# Patient Record
Sex: FEMALE | Race: WHITE | NOT HISPANIC OR LATINO | Employment: FULL TIME | URBAN - NONMETROPOLITAN AREA
[De-identification: names, ages, dates, MRNs, and addresses within clinical notes are randomized per-mention and may not be internally consistent; named-entity substitution may affect disease eponyms.]

---

## 2023-11-09 ENCOUNTER — OFFICE VISIT (OUTPATIENT)
Dept: FAMILY MEDICINE | Facility: CLINIC | Age: 33
End: 2023-11-09
Payer: MEDICAID

## 2023-11-09 VITALS
RESPIRATION RATE: 18 BRPM | HEIGHT: 62 IN | BODY MASS INDEX: 28.89 KG/M2 | WEIGHT: 157 LBS | OXYGEN SATURATION: 96 % | HEART RATE: 88 BPM | SYSTOLIC BLOOD PRESSURE: 122 MMHG | DIASTOLIC BLOOD PRESSURE: 84 MMHG | TEMPERATURE: 99 F

## 2023-11-09 DIAGNOSIS — J30.9 ALLERGIC RHINITIS, UNSPECIFIED SEASONALITY, UNSPECIFIED TRIGGER: Primary | ICD-10-CM

## 2023-11-09 DIAGNOSIS — R09.81 NASAL CONGESTION: ICD-10-CM

## 2023-11-09 PROCEDURE — 3074F SYST BP LT 130 MM HG: CPT | Mod: CPTII,,, | Performed by: NURSE PRACTITIONER

## 2023-11-09 PROCEDURE — 1160F PR REVIEW ALL MEDS BY PRESCRIBER/CLIN PHARMACIST DOCUMENTED: ICD-10-PCS | Mod: CPTII,,, | Performed by: NURSE PRACTITIONER

## 2023-11-09 PROCEDURE — 3008F BODY MASS INDEX DOCD: CPT | Mod: CPTII,,, | Performed by: NURSE PRACTITIONER

## 2023-11-09 PROCEDURE — 3079F PR MOST RECENT DIASTOLIC BLOOD PRESSURE 80-89 MM HG: ICD-10-PCS | Mod: CPTII,,, | Performed by: NURSE PRACTITIONER

## 2023-11-09 PROCEDURE — 3008F PR BODY MASS INDEX (BMI) DOCUMENTED: ICD-10-PCS | Mod: CPTII,,, | Performed by: NURSE PRACTITIONER

## 2023-11-09 PROCEDURE — 1159F MED LIST DOCD IN RCRD: CPT | Mod: CPTII,,, | Performed by: NURSE PRACTITIONER

## 2023-11-09 PROCEDURE — 99204 OFFICE O/P NEW MOD 45 MIN: CPT | Mod: ,,, | Performed by: NURSE PRACTITIONER

## 2023-11-09 PROCEDURE — 99204 PR OFFICE/OUTPT VISIT, NEW, LEVL IV, 45-59 MIN: ICD-10-PCS | Mod: ,,, | Performed by: NURSE PRACTITIONER

## 2023-11-09 PROCEDURE — 1159F PR MEDICATION LIST DOCUMENTED IN MEDICAL RECORD: ICD-10-PCS | Mod: CPTII,,, | Performed by: NURSE PRACTITIONER

## 2023-11-09 PROCEDURE — 3079F DIAST BP 80-89 MM HG: CPT | Mod: CPTII,,, | Performed by: NURSE PRACTITIONER

## 2023-11-09 PROCEDURE — 3074F PR MOST RECENT SYSTOLIC BLOOD PRESSURE < 130 MM HG: ICD-10-PCS | Mod: CPTII,,, | Performed by: NURSE PRACTITIONER

## 2023-11-09 PROCEDURE — 1160F RVW MEDS BY RX/DR IN RCRD: CPT | Mod: CPTII,,, | Performed by: NURSE PRACTITIONER

## 2023-11-09 RX ORDER — FLUTICASONE FUROATE 27.5 UG/1
2 SPRAY, METERED NASAL DAILY
Qty: 9.1 ML | Refills: 0 | Status: SHIPPED | OUTPATIENT
Start: 2023-11-09 | End: 2024-01-18 | Stop reason: SDUPTHER

## 2023-11-09 NOTE — PROGRESS NOTES
Marline Thakur DNP, MIRANDA    [address] [place] [address]     PATIENT NAME: Denise Fuller  : 1990  DATE: 23  MRN: [id number]      Billing Provider: Marline Thakur DNP, FNP  Level of Service:   Patient PCP Information       Provider PCP Type    Primary Doctor No General            Reason for Visit / Chief Complaint: Sinus Problem (Complains of sinus headache, otalgia, and scratchy throat.)       Update PCP  Update Chief Complaint         History of Present Illness / Problem Focused Workflow     Denise Fuller presents to the clinic with Sinus Problem (Complains of sinus headache, otalgia, and scratchy throat.)     Sinus Problem  Associated symptoms include congestion, sinus pressure and a sore throat. Pertinent negatives include no chills, coughing, ear pain, headaches or shortness of breath.       Review of Systems     Review of Systems   Constitutional:  Negative for activity change, appetite change, chills, fatigue and fever.   HENT:  Positive for nasal congestion, sinus pressure/congestion and sore throat. Negative for ear pain, hearing loss and postnasal drip.    Respiratory:  Negative for cough, chest tightness, shortness of breath and wheezing.    Cardiovascular:  Negative for chest pain, palpitations, leg swelling and claudication.   Gastrointestinal:  Negative for abdominal pain, change in bowel habit, constipation, diarrhea, nausea and vomiting.   Genitourinary:  Negative for dysuria.   Musculoskeletal:  Negative for arthralgias, back pain, gait problem and myalgias.   Integumentary:  Negative for rash.   Neurological:  Negative for weakness and headaches.   Psychiatric/Behavioral:  Negative for suicidal ideas. The patient is not nervous/anxious.         Medical / Social / Family History   History reviewed. No pertinent past medical history.    History reviewed. No pertinent surgical history.    Social History  Ms. Denise Fuller  reports that she has  "never smoked. She has never been exposed to tobacco smoke. She has never used smokeless tobacco. She reports that she does not drink alcohol and does not use drugs.    Family History  Ms. Denise Fuller's family history is not on file.    Medications and Allergies     Medications  No outpatient medications have been marked as taking for the 11/9/23 encounter (Office Visit) with Marlnie Thakur, LETITIA, MIRANDA.       Allergies  Review of patient's allergies indicates:  No Known Allergies    Physical Examination     Vitals:    11/09/23 1647   BP: 122/84   BP Location: Left arm   Patient Position: Sitting   BP Method: Large (Automatic)   Pulse: 88   Resp: 18   Temp: 98.7 °F (37.1 °C)   TempSrc: Oral   SpO2: 96%   Weight: 71.2 kg (157 lb)   Height: 5' 2" (1.575 m)     Physical Exam  Vitals and nursing note reviewed.   Constitutional:       General: She is not in acute distress.     Appearance: Normal appearance. She is not ill-appearing.   HENT:      Nose: Congestion and rhinorrhea present.      Mouth/Throat:      Pharynx: No posterior oropharyngeal erythema.   Eyes:      Extraocular Movements: Extraocular movements intact.      Pupils: Pupils are equal, round, and reactive to light.   Cardiovascular:      Rate and Rhythm: Normal rate and regular rhythm.      Heart sounds: Normal heart sounds.   Pulmonary:      Effort: Pulmonary effort is normal.      Breath sounds: Normal breath sounds. No wheezing, rhonchi or rales.   Abdominal:      General: Bowel sounds are normal.      Palpations: Abdomen is soft.   Musculoskeletal:         General: Normal range of motion.   Skin:     Findings: No rash.   Neurological:      General: No focal deficit present.      Mental Status: She is alert and oriented to person, place, and time. Mental status is at baseline.   Psychiatric:         Mood and Affect: Mood normal.         Behavior: Behavior normal.          Assessment and Plan (including Health Maintenance)      Problem List  Smart Sets  " Document Outside HM   :    Plan:         Health Maintenance Due   Topic Date Due    Hepatitis C Screening  Never done    Cervical Cancer Screening  Never done    Lipid Panel  Never done    COVID-19 Vaccine (1) Never done    HIV Screening  Never done    TETANUS VACCINE  Never done    Influenza Vaccine (1) Never done    Hemoglobin A1c (Diabetic Prevention Screening)  Never done       Problem List Items Addressed This Visit    None  Visit Diagnoses       Allergic rhinitis, unspecified seasonality, unspecified trigger    -  Primary    Nasal congestion              Allergic rhinitis, unspecified seasonality, unspecified trigger    Nasal congestion       The patient has no Health Maintenance topics of status Not Due        No future appointments.     Follow up if symptoms worsen or fail to improve.     Signature:  Marline Thakur DNP, FNP  [address] [place] [address]  Phone #: [phone]  Fax #: [phone]    Date of encounter: 11/9/23    Patient Instructions   Increase fluid intake. Take meds as prescribed. Follow up if no improvement in 5-7 days.

## 2024-01-17 ENCOUNTER — OFFICE VISIT (OUTPATIENT)
Dept: FAMILY MEDICINE | Facility: CLINIC | Age: 34
End: 2024-01-17
Payer: MEDICAID

## 2024-01-17 VITALS
HEART RATE: 82 BPM | DIASTOLIC BLOOD PRESSURE: 86 MMHG | RESPIRATION RATE: 18 BRPM | BODY MASS INDEX: 31.08 KG/M2 | SYSTOLIC BLOOD PRESSURE: 123 MMHG | WEIGHT: 164.63 LBS | OXYGEN SATURATION: 96 % | TEMPERATURE: 99 F | HEIGHT: 61 IN

## 2024-01-17 DIAGNOSIS — R68.83 CHILLS: ICD-10-CM

## 2024-01-17 DIAGNOSIS — R05.1 ACUTE COUGH: ICD-10-CM

## 2024-01-17 DIAGNOSIS — J02.9 SORE THROAT: ICD-10-CM

## 2024-01-17 DIAGNOSIS — H92.09 OTALGIA, UNSPECIFIED LATERALITY: ICD-10-CM

## 2024-01-17 DIAGNOSIS — J10.1 INFLUENZA B: Primary | ICD-10-CM

## 2024-01-17 LAB
CTP QC/QA: YES
FLUAV AG NPH QL: NEGATIVE
FLUBV AG NPH QL: POSITIVE
S PYO RRNA THROAT QL PROBE: NEGATIVE
SARS-COV-2 AG RESP QL IA.RAPID: NEGATIVE

## 2024-01-17 PROCEDURE — 1160F RVW MEDS BY RX/DR IN RCRD: CPT | Mod: CPTII,,, | Performed by: NURSE PRACTITIONER

## 2024-01-17 PROCEDURE — 87880 STREP A ASSAY W/OPTIC: CPT | Mod: RHCUB | Performed by: NURSE PRACTITIONER

## 2024-01-17 PROCEDURE — 3008F BODY MASS INDEX DOCD: CPT | Mod: CPTII,,, | Performed by: NURSE PRACTITIONER

## 2024-01-17 PROCEDURE — 3079F DIAST BP 80-89 MM HG: CPT | Mod: CPTII,,, | Performed by: NURSE PRACTITIONER

## 2024-01-17 PROCEDURE — 1159F MED LIST DOCD IN RCRD: CPT | Mod: CPTII,,, | Performed by: NURSE PRACTITIONER

## 2024-01-17 PROCEDURE — 87426 SARSCOV CORONAVIRUS AG IA: CPT | Mod: RHCUB | Performed by: NURSE PRACTITIONER

## 2024-01-17 PROCEDURE — 87804 INFLUENZA ASSAY W/OPTIC: CPT | Mod: RHCUB | Performed by: NURSE PRACTITIONER

## 2024-01-17 PROCEDURE — 3074F SYST BP LT 130 MM HG: CPT | Mod: CPTII,,, | Performed by: NURSE PRACTITIONER

## 2024-01-17 PROCEDURE — 99213 OFFICE O/P EST LOW 20 MIN: CPT | Mod: ,,, | Performed by: NURSE PRACTITIONER

## 2024-01-17 RX ORDER — NORGESTIMATE AND ETHINYL ESTRADIOL 0.25-0.035
1 KIT ORAL DAILY
COMMUNITY
Start: 2024-01-08

## 2024-01-17 NOTE — PATIENT INSTRUCTIONS
Rest. Increase fluid intake. Symptomatic treatment.  Treat fever with ibuprofen or acetaminophen. Follow up if no improvement in 5-7 days.

## 2024-01-17 NOTE — PROGRESS NOTES
Marline Thakur DNP, FNP    [facility]  [address] [place] [address] [place] [address]     PATIENT NAME: Denise Fuller  : 1990  DATE: 24  MRN: [id number]      Billing Provider: Marline Thakur DNP, FNP  Level of Service: CT OFFICE/OUTPT VISIT, EST, LEVL III, 20-29 MIN  Patient PCP Information       Provider PCP Type    Primary Doctor No General            Reason for Visit / Chief Complaint: Fever (Fever and chills), Headache, Sore Throat, and Cough (Symptoms started yesterday.)       Update PCP  Update Chief Complaint         History of Present Illness / Problem Focused Workflow     Denise Fuller presents to the clinic with Fever (Fever and chills), Headache, Sore Throat, and Cough (Symptoms started yesterday.)     Fever   Associated symptoms include coughing, headaches and a sore throat. Pertinent negatives include no abdominal pain, chest pain, congestion, diarrhea, ear pain, nausea, rash, urinary pain, vomiting or wheezing.   Headache   Associated symptoms include coughing, a fever and a sore throat. Pertinent negatives include no abdominal pain, back pain, ear pain, hearing loss, nausea, vomiting or weakness.   Sore Throat   Associated symptoms include coughing and headaches. Pertinent negatives include no abdominal pain, congestion, diarrhea, ear pain, shortness of breath or vomiting.   Cough  Associated symptoms include a fever, headaches and a sore throat. Pertinent negatives include no chest pain, chills, ear pain, myalgias, postnasal drip, rash, shortness of breath or wheezing.       Review of Systems     Review of Systems   Constitutional:  Positive for fever. Negative for activity change, appetite change, chills and fatigue.   HENT:  Positive for sore throat. Negative for nasal congestion, ear pain, hearing loss and postnasal drip.    Respiratory:  Positive for cough. Negative for chest tightness, shortness of breath and wheezing.    Cardiovascular:  Negative for chest pain,  "palpitations, leg swelling and claudication.   Gastrointestinal:  Negative for abdominal pain, change in bowel habit, constipation, diarrhea, nausea and vomiting.   Genitourinary:  Negative for dysuria.   Musculoskeletal:  Negative for arthralgias, back pain, gait problem and myalgias.   Integumentary:  Negative for rash.   Neurological:  Positive for headaches. Negative for weakness.   Psychiatric/Behavioral:  Negative for suicidal ideas. The patient is not nervous/anxious.         Medical / Social / Family History   History reviewed. No pertinent past medical history.    History reviewed. No pertinent surgical history.    Social History  Ms. Denise Fuller  reports that she has never smoked. She has never been exposed to tobacco smoke. She has never used smokeless tobacco. She reports that she does not drink alcohol and does not use drugs.    Family History  Ms. Denise Fuller's family history is not on file.    Medications and Allergies     Medications  Outpatient Medications Marked as Taking for the 1/17/24 encounter (Office Visit) with Marline Thakur, LETITIA, MIRANDA   Medication Sig Dispense Refill    fluticasone (FLONASE SENSIMIST) 27.5 mcg/actuation nasal spray 2 sprays by Nasal route once daily. 9.1 mL 0    SPRINTEC, 28, 0.25-35 mg-mcg per tablet Take 1 tablet by mouth once daily.         Allergies  Review of patient's allergies indicates:  No Known Allergies    Physical Examination     Vitals:    01/17/24 1338   BP: 123/86   BP Location: Left arm   Patient Position: Sitting   BP Method: Large (Automatic)   Pulse: 82   Resp: 18   Temp: 98.6 °F (37 °C)   TempSrc: Oral   SpO2: 96%   Weight: 74.7 kg (164 lb 9.6 oz)   Height: 5' 1" (1.549 m)     Physical Exam  Vitals and nursing note reviewed.   Constitutional:       General: She is not in acute distress.     Appearance: Normal appearance. She is not ill-appearing.   HENT:      Nose: Congestion and rhinorrhea present.      Mouth/Throat:      Pharynx: No posterior " oropharyngeal erythema.   Eyes:      Extraocular Movements: Extraocular movements intact.      Pupils: Pupils are equal, round, and reactive to light.   Cardiovascular:      Rate and Rhythm: Normal rate and regular rhythm.      Heart sounds: Normal heart sounds.   Pulmonary:      Effort: Pulmonary effort is normal.      Breath sounds: Normal breath sounds.   Abdominal:      General: Bowel sounds are normal.      Palpations: Abdomen is soft.   Musculoskeletal:         General: Normal range of motion.   Skin:     Findings: No rash.   Neurological:      General: No focal deficit present.      Mental Status: She is alert and oriented to person, place, and time. Mental status is at baseline.   Psychiatric:         Mood and Affect: Mood normal.         Behavior: Behavior normal.          Assessment and Plan (including Health Maintenance)      Problem List  Smart Sets  Document Outside HM   :    Plan:         Health Maintenance Due   Topic Date Due    Hepatitis C Screening  Never done    Cervical Cancer Screening  Never done    Lipid Panel  Never done    COVID-19 Vaccine (1) Never done    HIV Screening  Never done    TETANUS VACCINE  Never done    Hemoglobin A1c (Diabetic Prevention Screening)  Never done       Problem List Items Addressed This Visit    None  Visit Diagnoses       Influenza B    -  Primary    Sore throat        Relevant Medications    pyrilamine-chlophedianoL 12.5-12.5 mg/5 mL Liqd    Other Relevant Orders    POCT Influenza A/B (Completed)    SARS Coronavirus 2 Antigen, POCT (Completed)    POCT rapid strep A (Completed)    Chills        Relevant Orders    POCT Influenza A/B (Completed)    SARS Coronavirus 2 Antigen, POCT (Completed)    POCT rapid strep A (Completed)    Otalgia, unspecified laterality        Relevant Orders    POCT Influenza A/B (Completed)    SARS Coronavirus 2 Antigen, POCT (Completed)    Acute cough        Relevant Medications    pyrilamine-chlophedianoL 12.5-12.5 mg/5 mL Liqd           Influenza B    Sore throat  -     POCT Influenza A/B  -     SARS Coronavirus 2 Antigen, POCT  -     POCT rapid strep A  -     pyrilamine-chlophedianoL 12.5-12.5 mg/5 mL Liqd; Take 10 mLs by mouth every 8 (eight) hours as needed (cough/sneezing/drainage).  Dispense: 120 mL; Refill: 1    Chills  -     POCT Influenza A/B  -     SARS Coronavirus 2 Antigen, POCT  -     POCT rapid strep A    Otalgia, unspecified laterality  -     POCT Influenza A/B  -     SARS Coronavirus 2 Antigen, POCT    Acute cough  -     pyrilamine-chlophedianoL 12.5-12.5 mg/5 mL Liqd; Take 10 mLs by mouth every 8 (eight) hours as needed (cough/sneezing/drainage).  Dispense: 120 mL; Refill: 1       The patient has no Health Maintenance topics of status Not Due        No future appointments.       Follow up if symptoms worsen or fail to improve.     Signature:  Marline Thakur DNP, FNP  [address] [place] [address]  Phone #: [phone]  Fax #: [phone]    Date of encounter: 1/17/24    Patient Instructions   Rest. Increase fluid intake. Symptomatic treatment.  Treat fever with ibuprofen or acetaminophen. Follow up if no improvement in 5-7 days.

## 2024-01-17 NOTE — LETTER
January 17, 2024      [facility]  [address] [place] [address]  Phone: [phone]  Fax: [phone]       Patient: Denise Fuller   YOB: 1990  Date of Visit: 01/17/2024    To Whom It May Concern:    Eveline Fuller  was at [facility] on 01/17/2024. The patient may return to work/school on 1/22/2024 with no restrictions. If you have any questions or concerns, or if I can be of further assistance, please do not hesitate to contact me.    Sincerely,    TINA Hwang DNP, FNP-C

## 2024-01-18 DIAGNOSIS — J30.9 ALLERGIC RHINITIS, UNSPECIFIED SEASONALITY, UNSPECIFIED TRIGGER: ICD-10-CM

## 2024-01-18 DIAGNOSIS — R09.81 NASAL CONGESTION: ICD-10-CM

## 2024-01-18 RX ORDER — FLUTICASONE FUROATE 27.5 UG/1
2 SPRAY, METERED NASAL DAILY
Qty: 9.1 ML | Refills: 0 | Status: SHIPPED | OUTPATIENT
Start: 2024-01-18

## 2024-01-18 NOTE — TELEPHONE ENCOUNTER
Pt called reporting B ear pain with pos. Flu yesterday.  Notified MIRANDA Zhao, who said patient should take Ibuprofen prn pain and she will send in Flonase to help relieve pressure.  Notified pt and understanding verbalized.  Pt requests [facility].